# Patient Record
Sex: FEMALE | Race: WHITE | URBAN - METROPOLITAN AREA
[De-identification: names, ages, dates, MRNs, and addresses within clinical notes are randomized per-mention and may not be internally consistent; named-entity substitution may affect disease eponyms.]

---

## 2021-01-04 NOTE — IMPRESSION/PLAN
Impression: Degenerative myopia, bilateral: H44.23. Bilateral. Status: Symptomatic. Plan: Patient notes worse vision. Exam and OCT confirm the presence of RPE changes and lacquer cracks consistent with myopic degeneration. The diagnosis, natural history, and prognosis of this disease were discussed at length. The patient was instructed that AREDS2 vitamins may be helpful and informed that smoking increases the risk of blindness for this disease. The patient was educated on the proper use of the Amsler grid and encouraged to use it daily to monitor the vision in each eye. The patient was instructed to call our office immediately upon any decreased vision or increased distortion. 

RTC 6 mo with OCT OU

## 2021-01-04 NOTE — IMPRESSION/PLAN
Impression: Presence of intraocular lens: Z96.1. Left. Bilateral. Status: Asymptomatic. Plan: Lens in good place.

## 2021-01-04 NOTE — IMPRESSION/PLAN
Impression: Macular cyst, hole, or pseudohole, left eye: H35.342. Plan: Clinical exam and OCT demonstrate a lamellar macular hole. Lamellar holes usually do not lead to progressive vision loss; surgery may be warranted if the patient has increased visual symptoms, but is not indicated if the patient is stable. Discussed R/B/A of PPV/MP vs observation. Observation is recommended at this time.

## 2021-01-04 NOTE — IMPRESSION/PLAN
Impression: Dry eye syndrome of bilateral lacrimal glands: H04.123. Plan: Patient notes tearing. Exam demonstrates PEE. Discussed R/B/A of ATs, PFATs, Restasis, vs punctal plugs.  Rec ATs

## 2021-07-19 NOTE — IMPRESSION/PLAN
Impression: Degenerative myopia, bilateral: H44.23. Bilateral. Status: Symptomatic. Plan: Patient here for follow up of myopic degeneration. Exam and OCT confirm the presence of RPE changes and lacquer cracks consistent with myopic degeneration. The diagnosis, natural history, and prognosis of this disease were discussed at length. The patient was instructed that AREDS2 vitamins may be helpful and informed that smoking increases the risk of blindness for this disease. The patient was educated on the proper use of the Amsler grid and encouraged to use it daily to monitor the vision in each eye. The patient was instructed to call our office immediately upon any decreased vision or increased distortion. 

RTC 9-12 mo with OCT OU Consent: The patient's verbal consent was obtained including but not limited to risks of crusting, scabbing, blistering, scarring, darker or lighter pigmentary change, recurrence, incomplete removal and infection. Number Of Freeze-Thaw Cycles: 2 freeze-thaw cycles Render Note In Bullet Format When Appropriate: No Duration Of Freeze Thaw-Cycle (Seconds): 3 Render Post-Care Instructions In Note?: yes Detail Level: Detailed Post-Care Instructions: I reviewed with the patient in detail post-care instructions. Patient is to wear sunprotection, and avoid picking at any of the treated lesions. Pt may apply Vaseline to crusted or scabbing areas.

## 2021-07-19 NOTE — IMPRESSION/PLAN
Impression: Presence of intraocular lens: Z96.1. Left. Bilateral. Status: Asymptomatic.
- h/o RK Plan: Lens in good place.

## 2022-05-13 ENCOUNTER — OFFICE VISIT (OUTPATIENT)
Dept: URBAN - METROPOLITAN AREA CLINIC 41 | Facility: CLINIC | Age: 72
End: 2022-05-13
Payer: MEDICARE

## 2022-05-13 DIAGNOSIS — H44.23 DEGENERATIVE MYOPIA, BILATERAL: ICD-10-CM

## 2022-05-13 DIAGNOSIS — Z96.1 PRESENCE OF INTRAOCULAR LENS: ICD-10-CM

## 2022-05-13 DIAGNOSIS — H35.3131 NONEXUDATIVE AGE-RELATED MACULAR DEGENERATION, BILATERAL, EARLY DRY STAGE: ICD-10-CM

## 2022-05-13 DIAGNOSIS — H35.342 MACULAR CYST, HOLE, OR PSEUDOHOLE, LEFT EYE: Primary | ICD-10-CM

## 2022-05-13 PROCEDURE — 92134 CPTRZ OPH DX IMG PST SGM RTA: CPT | Performed by: OPHTHALMOLOGY

## 2022-05-13 PROCEDURE — 92014 COMPRE OPH EXAM EST PT 1/>: CPT | Performed by: OPHTHALMOLOGY

## 2022-05-13 ASSESSMENT — INTRAOCULAR PRESSURE
OS: 15
OD: 12

## 2022-05-13 NOTE — IMPRESSION/PLAN
Impression: Degenerative myopia, bilateral: H44.23. Bilateral. Status: Symptomatic. Plan: Patient here for follow up of myopic degeneration. Exam and OCT confirm the presence of RPE changes and lacquer cracks consistent with myopic degeneration. The diagnosis, natural history, and prognosis of this disease were discussed at length. The patient was instructed that AREDS2 vitamins may be helpful and informed that smoking increases the risk of blindness for this disease. The patient was educated on the proper use of the Amsler grid and encouraged to use it daily to monitor the vision in each eye. The patient was instructed to call our office immediately upon any decreased vision or increased distortion. 

RTC 12 mo with OCT OU

## 2022-05-13 NOTE — IMPRESSION/PLAN
Impression: Macular cyst, hole, or pseudohole, left eye: H35.342. Plan: Clinical exam and OCT demonstrate a stable lamellar macular hole. Lamellar holes usually do not lead to progressive vision loss; surgery may be warranted if the patient has increased visual symptoms, but is not indicated if the patient is stable. Discussed R/B/A of PPV/MP vs observation. Observation is recommended at this time.

## 2022-07-18 NOTE — IMPRESSION/PLAN
Impression: Degenerative myopia, bilateral: H44.23. Bilateral. Status: Symptomatic. Plan: Patient here for follow up of myopic degeneration and blurry vision OS. Exam and OCT confirm the presence of RPE changes and lacquer cracks consistent with myopic degeneration. The diagnosis, natural history, and prognosis of this disease were discussed at length. The patient was instructed that AREDS2 vitamins may be helpful and informed that smoking increases the risk of blindness for this disease. The patient was educated on the proper use of the Amsler grid and encouraged to use it daily to monitor the vision in each eye. The patient was instructed to call our office immediately upon any decreased vision or increased distortion. 

RTC 12 mo with OCT OU

## 2022-07-25 NOTE — IMPRESSION/PLAN
Impression: Degenerative myopia, bilateral: H44.23. Bilateral. Status: Symptomatic. Plan: OCT of macula ordered and performed today ou. No SRF, IRF or hemorrhages OU. cont care with Dr Radha Abrams MD. 

The patient was instructed that AREDS2 vitamins may be helpful and informed that smoking increases the risk of blindness for this disease. The patient was educated on the proper use of the Amsler grid and encouraged to use it daily to monitor the vision in each eye. The patient was instructed to call our office immediately upon any decreased vision or increased distortion. 

RTC 12 mo with OCT OU with Dr Radha Abrams

## 2022-07-25 NOTE — IMPRESSION/PLAN
Impression: Other secondary cataract, left eye: H26.492. Plan: Recommend YAG cap OS. Refer to MD for YAG procedure.

## 2023-05-12 NOTE — IMPRESSION/PLAN
Impression: Presence of intraocular lens: Z96.1. Left. Bilateral. Status: Asymptomatic.
- h/o RK Plan: Lens in good place.   Vision improved after YAG